# Patient Record
Sex: MALE | Race: WHITE | NOT HISPANIC OR LATINO | Employment: OTHER | RURAL
[De-identification: names, ages, dates, MRNs, and addresses within clinical notes are randomized per-mention and may not be internally consistent; named-entity substitution may affect disease eponyms.]

---

## 2023-03-06 ENCOUNTER — OFFICE VISIT (OUTPATIENT)
Dept: CARDIOLOGY | Facility: CLINIC | Age: 78
End: 2023-03-06
Payer: MEDICARE

## 2023-03-06 VITALS
SYSTOLIC BLOOD PRESSURE: 140 MMHG | WEIGHT: 208 LBS | BODY MASS INDEX: 25.86 KG/M2 | OXYGEN SATURATION: 97 % | DIASTOLIC BLOOD PRESSURE: 78 MMHG | HEIGHT: 75 IN | HEART RATE: 73 BPM

## 2023-03-06 DIAGNOSIS — E11.9 TYPE 2 DIABETES MELLITUS WITHOUT COMPLICATION, WITHOUT LONG-TERM CURRENT USE OF INSULIN: ICD-10-CM

## 2023-03-06 DIAGNOSIS — I65.23 BILATERAL CAROTID ARTERY OCCLUSION: ICD-10-CM

## 2023-03-06 DIAGNOSIS — I10 HTN (HYPERTENSION), BENIGN: ICD-10-CM

## 2023-03-06 DIAGNOSIS — I63.9 CEREBROVASCULAR ACCIDENT (CVA), UNSPECIFIED MECHANISM: Primary | ICD-10-CM

## 2023-03-06 DIAGNOSIS — I25.10 ATHEROSCLEROSIS OF NATIVE CORONARY ARTERY OF NATIVE HEART WITHOUT ANGINA PECTORIS: ICD-10-CM

## 2023-03-06 PROCEDURE — 99214 OFFICE O/P EST MOD 30 MIN: CPT | Performed by: NURSE PRACTITIONER

## 2023-03-06 RX ORDER — ATORVASTATIN CALCIUM 40 MG/1
40 TABLET, FILM COATED ORAL NIGHTLY
Qty: 90 TABLET | Refills: 1 | Status: SHIPPED | OUTPATIENT
Start: 2023-03-06

## 2023-03-06 RX ORDER — LISINOPRIL 10 MG/1
TABLET ORAL
COMMUNITY
Start: 2023-01-05 | End: 2023-03-06

## 2023-03-06 RX ORDER — METFORMIN HYDROCHLORIDE 500 MG/1
TABLET, EXTENDED RELEASE ORAL
COMMUNITY
Start: 2023-01-16

## 2023-03-06 RX ORDER — METOPROLOL SUCCINATE 25 MG/1
12.5 TABLET, EXTENDED RELEASE ORAL DAILY
COMMUNITY
Start: 2023-01-16 | End: 2023-03-06

## 2023-03-06 RX ORDER — ATORVASTATIN CALCIUM 40 MG/1
TABLET, FILM COATED ORAL
COMMUNITY
Start: 2023-01-16 | End: 2023-03-06

## 2023-03-06 RX ORDER — ASPIRIN 81 MG/1
81 TABLET ORAL DAILY
COMMUNITY

## 2023-03-06 RX ORDER — METOPROLOL SUCCINATE 25 MG/1
25 TABLET, EXTENDED RELEASE ORAL DAILY
Qty: 90 TABLET | Refills: 1 | Status: SHIPPED | OUTPATIENT
Start: 2023-03-06

## 2023-03-06 RX ORDER — LISINOPRIL 10 MG/1
10 TABLET ORAL DAILY
Qty: 90 TABLET | Refills: 1 | Status: SHIPPED | OUTPATIENT
Start: 2023-03-06

## 2023-03-06 NOTE — PROGRESS NOTES
[facility] Provider Note     Date:   2023   Name: Tong Viera  :   1945  PCP: Elaine Nelson APRN    Chief Complaint   Patient presents with   • Congestive Heart Failure   • Coronary Artery Disease     6 month follow up       Subjective     History of Present Illness  Tong Viera is a 77 y.o. male who presents today for coronary artery disease, hypertension, mild bilateral carotid artery disease.  Patient denies any chest pain, shortness of air, edema, palpitations, or syncope.  He has blood pressure is not at goal.  Patient is willing to take 1 whole metoprolol daily as opposed to half.        Allergies   Allergen Reactions   • Cephalexin Unknown - Low Severity   • Demerol [Meperidine] Unknown - Low Severity   • Percocet [Oxycodone-Acetaminophen] Unknown - Low Severity       Current Outpatient Medications:   •  aspirin 81 MG EC tablet, Take 1 tablet by mouth Daily. Every other day, Disp: , Rfl:   •  atorvastatin (LIPITOR) 40 MG tablet, Take 1 tablet by mouth Every Night., Disp: 90 tablet, Rfl: 1  •  lisinopril (PRINIVIL,ZESTRIL) 10 MG tablet, Take 1 tablet by mouth Daily., Disp: 90 tablet, Rfl: 1  •  metFORMIN ER (GLUCOPHAGE-XR) 500 MG 24 hr tablet, , Disp: , Rfl:   •  metoprolol succinate XL (TOPROL-XL) 25 MG 24 hr tablet, Take 1 tablet by mouth Daily., Disp: 90 tablet, Rfl: 1    Past Medical History:   Diagnosis Date   • Allergies     DEMEROL   • Athscl heart disease of native cor art w ot ang pctrs ([facility])    • CAD (coronary artery disease)    • Carotid stenosis    • CVA (cerebral vascular accident) ([facility])    • Essential (primary) hypertension    • Hypercholesteremia    • Hypertension    • Infectious gastroenteritis and colitis, unspecified    • Occlusion and stenosis of bilateral carotid arteries    • Skin cancer    • Type 2 diabetes mellitus ([facility])       Past Surgical History:   Procedure Laterality Date   • CAROTID STENT     • REPLACEMENT TOTAL KNEE Right   "    Family History   Problem Relation Age of Onset   • No Known Problems Sister    • Heart disease Brother 74        CABG     Social History     Socioeconomic History   • Marital status: Single   Tobacco Use   • Smoking status: Former     Types: Cigarettes   Substance and Sexual Activity   • Drug use: Not Currently       Objective     Vital Signs:  /78 (BP Location: Left arm)   Pulse 73   Ht 190.5 cm (75\")   Wt 94.3 kg (208 lb)   SpO2 97%   BMI 26.00 kg/m²   Estimated body mass index is 26 kg/m² as calculated from the following:    Height as of this encounter: 190.5 cm (75\").    Weight as of this encounter: 94.3 kg (208 lb).             Physical Exam  Vitals reviewed.   Constitutional:       Appearance: Normal appearance.   Eyes:      Pupils: Pupils are equal, round, and reactive to light.   Neck:      Vascular: No carotid bruit.   Cardiovascular:      Rate and Rhythm: Normal rate and regular rhythm.      Pulses: Normal pulses.      Heart sounds: Normal heart sounds.   Pulmonary:      Effort: Pulmonary effort is normal.      Breath sounds: Normal breath sounds.   Musculoskeletal:         General: Normal range of motion.      Right lower leg: No edema.      Left lower leg: No edema.   Skin:     General: Skin is warm and dry.   Neurological:      Mental Status: He is alert and oriented to person, place, and time.      Gait: Gait is intact.   Psychiatric:         Attention and Perception: Attention normal.                     Assessment and Plan     Diagnoses and all orders for this visit:    1. Cerebrovascular accident (CVA), unspecified mechanism (HCC) (Primary)  Assessment & Plan:  History 2020 with 2 stents.  On aspirin, statin, blood pressure lowering medication.    Orders:  -     atorvastatin (LIPITOR) 40 MG tablet; Take 1 tablet by mouth Every Night.  Dispense: 90 tablet; Refill: 1  -     lisinopril (PRINIVIL,ZESTRIL) 10 MG tablet; Take 1 tablet by mouth Daily.  Dispense: 90 tablet; Refill: 1  -     " metoprolol succinate XL (TOPROL-XL) 25 MG 24 hr tablet; Take 1 tablet by mouth Daily.  Dispense: 90 tablet; Refill: 1    2. Atherosclerosis of native coronary artery of native heart without angina pectoris  Assessment & Plan:  Stable.  Denies chest pain.  Able to do ADLs.  On medical therapy.    Orders:  -     atorvastatin (LIPITOR) 40 MG tablet; Take 1 tablet by mouth Every Night.  Dispense: 90 tablet; Refill: 1  -     lisinopril (PRINIVIL,ZESTRIL) 10 MG tablet; Take 1 tablet by mouth Daily.  Dispense: 90 tablet; Refill: 1  -     metoprolol succinate XL (TOPROL-XL) 25 MG 24 hr tablet; Take 1 tablet by mouth Daily.  Dispense: 90 tablet; Refill: 1    3. HTN (hypertension), benign  Assessment & Plan:  Blood pressure not at goal today given history of coronary artery disease and CVA.  Will change metoprolol 25 mg half daily to 1 whole 1 daily.  Patient wants to come back in 6 months and not for blood pressure check sooner.  Agrees to check his blood pressure once weekly for 3 weeks and to call me with readings.    Orders:  -     lisinopril (PRINIVIL,ZESTRIL) 10 MG tablet; Take 1 tablet by mouth Daily.  Dispense: 90 tablet; Refill: 1  -     metoprolol succinate XL (TOPROL-XL) 25 MG 24 hr tablet; Take 1 tablet by mouth Daily.  Dispense: 90 tablet; Refill: 1    4. Bilateral carotid artery occlusion  Assessment & Plan:  Mild September 2021.  On aspirin and statin.    Orders:  -     atorvastatin (LIPITOR) 40 MG tablet; Take 1 tablet by mouth Every Night.  Dispense: 90 tablet; Refill: 1    5. Type 2 diabetes mellitus without complication, without long-term current use of insulin (HCC)  Assessment & Plan:  Diagnosed 2021.  Will need lifelong statin    Orders:  -     atorvastatin (LIPITOR) 40 MG tablet; Take 1 tablet by mouth Every Night.  Dispense: 90 tablet; Refill: 1      Recommendations: ER if symptoms increase, Limitations of stress testing for definitive diagnosis reviewed, Sleep hygiene discussed, Limit salt, Stop  cigarettes, Limit caffeine and Report if any new/changing symptoms immediately          Follow Up  Return in about 6 months (around 9/6/2023) for Cad/htn.  Patient was given instructions and counseling regarding his condition or for health maintenance advice. Please see specific information pulled into the AVS if appropriate.

## 2023-03-06 NOTE — ASSESSMENT & PLAN NOTE
Blood pressure not at goal today given history of coronary artery disease and CVA.  Will change metoprolol 25 mg half daily to 1 whole 1 daily.  Patient wants to come back in 6 months and not for blood pressure check sooner.  Agrees to check his blood pressure once weekly for 3 weeks and to call me with readings.

## 2023-03-06 NOTE — PATIENT INSTRUCTIONS
Take a whole Metoprolol daily, go back to half if feel dizzy.    Take BP once weekly, then call office with readings.

## 2023-09-12 ENCOUNTER — OFFICE VISIT (OUTPATIENT)
Dept: CARDIOLOGY | Facility: CLINIC | Age: 78
End: 2023-09-12
Payer: MEDICARE

## 2023-09-12 VITALS
SYSTOLIC BLOOD PRESSURE: 130 MMHG | OXYGEN SATURATION: 97 % | HEIGHT: 75 IN | HEART RATE: 55 BPM | DIASTOLIC BLOOD PRESSURE: 78 MMHG | BODY MASS INDEX: 25.61 KG/M2 | WEIGHT: 206 LBS

## 2023-09-12 DIAGNOSIS — I10 HTN (HYPERTENSION), BENIGN: ICD-10-CM

## 2023-09-12 DIAGNOSIS — I25.10 ATHEROSCLEROSIS OF NATIVE CORONARY ARTERY OF NATIVE HEART WITHOUT ANGINA PECTORIS: ICD-10-CM

## 2023-09-12 PROCEDURE — 3075F SYST BP GE 130 - 139MM HG: CPT | Performed by: NURSE PRACTITIONER

## 2023-09-12 PROCEDURE — 99214 OFFICE O/P EST MOD 30 MIN: CPT | Performed by: NURSE PRACTITIONER

## 2023-09-12 PROCEDURE — 3078F DIAST BP <80 MM HG: CPT | Performed by: NURSE PRACTITIONER

## 2023-09-12 RX ORDER — ATORVASTATIN CALCIUM 40 MG/1
40 TABLET, FILM COATED ORAL NIGHTLY
Qty: 90 TABLET | Refills: 1 | Status: SHIPPED | OUTPATIENT
Start: 2023-09-12

## 2023-09-12 RX ORDER — LISINOPRIL 10 MG/1
10 TABLET ORAL DAILY
Qty: 90 TABLET | Refills: 1 | Status: SHIPPED | OUTPATIENT
Start: 2023-09-12

## 2023-09-12 RX ORDER — METOPROLOL SUCCINATE 25 MG/1
25 TABLET, EXTENDED RELEASE ORAL DAILY
Qty: 90 TABLET | Refills: 1 | Status: SHIPPED | OUTPATIENT
Start: 2023-09-12

## 2023-09-12 NOTE — PROGRESS NOTES
[facility] Provider Note     Date:   2023   Name: Tong Viera  :   1945  PCP: Elaine Nelson APRN    Chief Complaint   Patient presents with    Coronary Artery Disease    Hypertension       Subjective     History of Present Illness  Tong Viera is a 78 y.o. male who presents today for follow-up on CAD and hypertension.  He is due for an updated EKG today but the leads would have to go in the area that he has an incision.  He had a mole removed 2 weeks ago under his left breast and has several stitches and significant bruising with tenderness.  He goes back to dermatology next week to have stitches removed.  He said he will be seeing his PCP next week for labs and ask if he could get an EKG at that time.    He said Miss Kelly Viera that works next-door is his grandson's wife.    He denies any chest pain, shortness of air, edema, palpitations, or syncope.  Last visit his blood pressure was not at goal.  He was willing to try to take 1 whole metoprolol daily as opposed to half.  He reports he has been doing well with this and has not had any dizziness or any signs or symptoms of hypotension.  Blood pressure today 130/78.    Cardiology and sleep related problem list    1.  CAD-s/p stenting  2.  Hypertension  3.  Former smoker    2021 left heart cath-patent pre-existing LAD stent.  No significant coronary stenosis.  There is a small OM branch with a proximal stenosis 50 to 70%.    2021 Lexiscan-minimum to mild lateral ischemia, low to intermediate risk.    Have asked staff to obtain last echo from [facility]    CVA or MI with stents in ?????    Allergies   Allergen Reactions    Cephalexin Unknown - Low Severity    Demerol [Meperidine] Unknown - Low Severity    Percocet [Oxycodone-Acetaminophen] Unknown - Low Severity       Current Outpatient Medications:     aspirin 81 MG EC tablet, Take 1 tablet by mouth Daily. Every other day, Disp: , Rfl:     atorvastatin  "(LIPITOR) 40 MG tablet, Take 1 tablet by mouth Every Night., Disp: 90 tablet, Rfl: 1    lisinopril (PRINIVIL,ZESTRIL) 10 MG tablet, Take 1 tablet by mouth Daily., Disp: 90 tablet, Rfl: 1    metFORMIN ER (GLUCOPHAGE-XR) 500 MG 24 hr tablet, , Disp: , Rfl:     metoprolol succinate XL (TOPROL-XL) 25 MG 24 hr tablet, Take 1 tablet by mouth Daily., Disp: 90 tablet, Rfl: 1    mupirocin (BACTROBAN) 2 % ointment, APPLY TO SURGICAL SITE DAILY. KEEP COVERED WITH BANDAGE, Disp: , Rfl:     Past Medical History:   Diagnosis Date    Allergies     DEMEROL    Athscl heart disease of native cor art w oth ang pctrs     CAD (coronary artery disease)     Carotid stenosis     CVA (cerebral vascular accident)     Essential (primary) hypertension     Hypercholesteremia     Hypertension     Infectious gastroenteritis and colitis, unspecified     Occlusion and stenosis of bilateral carotid arteries     Skin cancer     Type 2 diabetes mellitus       Past Surgical History:   Procedure Laterality Date    CAROTID STENT  2003    MOLE REMOVAL      REPLACEMENT TOTAL KNEE Right      Family History   Problem Relation Age of Onset    No Known Problems Sister     Heart disease Brother 74        CABG     Social History     Socioeconomic History    Marital status: Single   Tobacco Use    Smoking status: Former     Types: Cigarettes     Passive exposure: Past    Smokeless tobacco: Never   Vaping Use    Vaping Use: Never used   Substance and Sexual Activity    Alcohol use: Never    Drug use: Not Currently    Sexual activity: Defer       Objective     Vital Signs:  /78   Pulse 55   Ht 190.5 cm (75\")   Wt 93.4 kg (206 lb)   SpO2 97%   BMI 25.75 kg/m²   Estimated body mass index is 25.75 kg/m² as calculated from the following:    Height as of this encounter: 190.5 cm (75\").    Weight as of this encounter: 93.4 kg (206 lb).             Physical Exam  Vitals reviewed.   Constitutional:       Appearance: Normal appearance. He is well-developed. "   HENT:      Head: Normocephalic and atraumatic.   Eyes:      General: No scleral icterus.     Pupils: Pupils are equal, round, and reactive to light.   Neck:      Vascular: No carotid bruit.   Cardiovascular:      Rate and Rhythm: Normal rate and regular rhythm.      Pulses: Normal pulses.           Radial pulses are 2+ on the right side and 2+ on the left side.        Dorsalis pedis pulses are 2+ on the right side and 2+ on the left side.        Posterior tibial pulses are 2+ on the right side and 2+ on the left side.      Heart sounds: Normal heart sounds. No murmur heard.  Pulmonary:      Breath sounds: Normal breath sounds. No wheezing or rhonchi.   Musculoskeletal:      Right lower leg: No edema.      Left lower leg: No edema.   Skin:     General: Skin is warm and dry.      Capillary Refill: Capillary refill takes less than 2 seconds.      Coloration: Skin is not cyanotic.      Findings: Lesion present.      Nails: There is no clubbing.      Comments: Stitches intact and dry. ATILIO. Under left breast. Bruising and tenderness present.   Neurological:      Mental Status: He is alert and oriented to person, place, and time.      Motor: No weakness.      Gait: Gait normal.   Psychiatric:         Mood and Affect: Mood normal.         Behavior: Behavior is cooperative.         Thought Content: Thought content normal.         Cognition and Memory: Memory normal.                   Assessment and Plan     Diagnoses and all orders for this visit:    1. Atherosclerosis of native coronary artery of native heart without angina pectoris  Comments:  S/p 2 cardiac stents. stable.  On medical therapy.  Plans to get updated EKG and labs with PCP next week.  Orders:  -     atorvastatin (LIPITOR) 40 MG tablet; Take 1 tablet by mouth Every Night.  Dispense: 90 tablet; Refill: 1  -     lisinopril (PRINIVIL,ZESTRIL) 10 MG tablet; Take 1 tablet by mouth Daily.  Dispense: 90 tablet; Refill: 1  -     metoprolol succinate XL (TOPROL-XL)  25 MG 24 hr tablet; Take 1 tablet by mouth Daily.  Dispense: 90 tablet; Refill: 1    2. HTN (hypertension), benign  Comments:  Doing well on 1 whole metoprolol daily instead of half.  Plan to continue.        Recommendations: ER if symptoms increase and Report if any new/changing symptoms immediately              Follow Up  Return in about 6 months (around 3/12/2024) for CAD/HTN.    Leonie Montes, APRN   09/12/2023     Please note that this explicitly excludes time spent on other separate billable services such as performing procedures or test interpretation, when applicable.    This note was created using dictation software which occasionally transcribes nonsensical phrases. Please contact the provider if any clarification is needed.

## 2024-03-26 ENCOUNTER — OFFICE VISIT (OUTPATIENT)
Dept: CARDIOLOGY | Facility: CLINIC | Age: 79
End: 2024-03-26
Payer: MEDICARE

## 2024-03-26 VITALS
SYSTOLIC BLOOD PRESSURE: 130 MMHG | DIASTOLIC BLOOD PRESSURE: 74 MMHG | BODY MASS INDEX: 25.86 KG/M2 | OXYGEN SATURATION: 97 % | WEIGHT: 208 LBS | HEIGHT: 75 IN | HEART RATE: 54 BPM

## 2024-03-26 DIAGNOSIS — I25.10 ATHEROSCLEROSIS OF NATIVE CORONARY ARTERY OF NATIVE HEART WITHOUT ANGINA PECTORIS: Primary | ICD-10-CM

## 2024-03-26 DIAGNOSIS — I10 HTN (HYPERTENSION), BENIGN: ICD-10-CM

## 2024-03-26 PROCEDURE — 93000 ELECTROCARDIOGRAM COMPLETE: CPT | Performed by: NURSE PRACTITIONER

## 2024-03-26 PROCEDURE — 3078F DIAST BP <80 MM HG: CPT | Performed by: NURSE PRACTITIONER

## 2024-03-26 PROCEDURE — 3075F SYST BP GE 130 - 139MM HG: CPT | Performed by: NURSE PRACTITIONER

## 2024-03-26 PROCEDURE — 1159F MED LIST DOCD IN RCRD: CPT | Performed by: NURSE PRACTITIONER

## 2024-03-26 PROCEDURE — 99214 OFFICE O/P EST MOD 30 MIN: CPT | Performed by: NURSE PRACTITIONER

## 2024-03-26 PROCEDURE — 1160F RVW MEDS BY RX/DR IN RCRD: CPT | Performed by: NURSE PRACTITIONER

## 2024-03-26 RX ORDER — LISINOPRIL 10 MG/1
10 TABLET ORAL DAILY
Qty: 90 TABLET | Refills: 3 | Status: SHIPPED | OUTPATIENT
Start: 2024-03-26

## 2024-03-26 RX ORDER — ATORVASTATIN CALCIUM 40 MG/1
40 TABLET, FILM COATED ORAL NIGHTLY
Qty: 90 TABLET | Refills: 3 | Status: SHIPPED | OUTPATIENT
Start: 2024-03-26

## 2024-03-26 RX ORDER — METOPROLOL SUCCINATE 25 MG/1
25 TABLET, EXTENDED RELEASE ORAL DAILY
Qty: 90 TABLET | Refills: 3 | Status: SHIPPED | OUTPATIENT
Start: 2024-03-26

## 2024-03-26 NOTE — PROGRESS NOTES
[facility] Provider Note     Date:   2024   Name: Tong Viera  :   1945  PCP: Elaine Nelson APRN    Chief Complaint   Patient presents with    Coronary Artery Disease       Subjective     History of Present Illness  Tong Viera is a 78 y.o. male who presents today for 6-month follow-up visit for CAD and hypertension.  Patient has a history of CAD with previous stent to LAD in .  He reports that he has been doing very well since his last visit.  He denies chest pain, shortness of breath, palpitations, lower extremity edema or syncope.  He has no specific concerns or complaints today.  EKG showed sinus bradycardia with a rate of 53 bpm, nonspecific T wave abnormality.  He has not had routine labs in over a year.  His blood pressure has been stable at home.  It is 130/74 today.  Overall, patient is doing very well today.    Cardiac History     1.  CAD-s/p LAD stent in   2.  Hypertension  3.  Former smoker  4.  CVA in   5.  Mild carotid stenosis     2021 [facility]-patent pre-existing LAD stent.  No significant coronary stenosis.  There is a small OM branch with a proximal stenosis 50 to 70%.    2021 Lexiscan-minimum to mild lateral ischemia, low to intermediate risk.    Carotid Duplex - 16-49% stenosis bilaterally.     Reports Denies   Chest Pain [] [x]   Shortness of Air [] [x]   Palpitations [] [x]   Edema [] [x]   Dizziness [] [x]   Syncope [] [x]       Allergies   Allergen Reactions    Cephalexin Unknown - Low Severity    Demerol [Meperidine] Unknown - Low Severity    Percocet [Oxycodone-Acetaminophen] Unknown - Low Severity       Current Outpatient Medications:     aspirin 81 MG EC tablet, Take 1 tablet by mouth Daily. Every other day, Disp: , Rfl:     atorvastatin (LIPITOR) 40 MG tablet, Take 1 tablet by mouth Every Night., Disp: 90 tablet, Rfl: 3    lisinopril (PRINIVIL,ZESTRIL) 10 MG tablet, Take 1 tablet by mouth Daily., Disp: 90 tablet,  "Rfl: 3    metFORMIN ER (GLUCOPHAGE-XR) 500 MG 24 hr tablet, , Disp: , Rfl:     metoprolol succinate XL (TOPROL-XL) 25 MG 24 hr tablet, Take 1 tablet by mouth Daily., Disp: 90 tablet, Rfl: 3    Past Medical History:   Diagnosis Date    Allergies     DEMEROL    Athscl heart disease of native cor art w oth ang pctrs     CAD (coronary artery disease)     Carotid stenosis     CVA (cerebral vascular accident)     Essential (primary) hypertension     Hypercholesteremia     Hypertension     Infectious gastroenteritis and colitis, unspecified     Occlusion and stenosis of bilateral carotid arteries     Skin cancer     Type 2 diabetes mellitus       Past Surgical History:   Procedure Laterality Date    CAROTID STENT  2003    MOLE REMOVAL      REPLACEMENT TOTAL KNEE Right      Family History   Problem Relation Age of Onset    No Known Problems Sister     Heart disease Brother 74        CABG     Social History     Socioeconomic History    Marital status:    Tobacco Use    Smoking status: Former     Types: Cigarettes     Passive exposure: Past    Smokeless tobacco: Never   Vaping Use    Vaping status: Never Used   Substance and Sexual Activity    Alcohol use: Never    Drug use: Not Currently    Sexual activity: Defer       Objective     Vital Signs:  /74   Pulse 54   Ht 190.5 cm (75\")   Wt 94.3 kg (208 lb)   SpO2 97%   BMI 26.00 kg/m²   Estimated body mass index is 26 kg/m² as calculated from the following:    Height as of this encounter: 190.5 cm (75\").    Weight as of this encounter: 94.3 kg (208 lb).       BMI is >= 25 and <30. (Overweight) The following options were offered after discussion;: exercise counseling/recommendations      Physical Exam  Vitals reviewed.   Constitutional:       Appearance: Normal appearance.   HENT:      Head: Normocephalic.   Cardiovascular:      Rate and Rhythm: Normal rate and regular rhythm.      Heart sounds: Normal heart sounds.   Pulmonary:      Effort: Pulmonary effort is " normal.      Breath sounds: Normal breath sounds.   Musculoskeletal:      Right lower leg: No edema.      Left lower leg: No edema.   Skin:     General: Skin is warm and dry.      Capillary Refill: Capillary refill takes less than 2 seconds.   Neurological:      General: No focal deficit present.      Mental Status: He is alert and oriented to person, place, and time.   Psychiatric:         Mood and Affect: Mood normal.         Behavior: Behavior normal.                 ECG 12 Lead    Date/Time: 3/26/2024 3:23 PM  Performed by: Poonam Mills APRN    Authorized by: Poonam Mills APRN  Comparison: not compared with previous ECG   Previous ECG: no previous ECG available  Rhythm: sinus rhythm  Rate: bradycardic  BPM: 53  QRS axis: normal  Other findings: T wave abnormality    Clinical impression: abnormal EKG           Assessment and Plan     Diagnoses and all orders for this visit:    1. Atherosclerosis of native coronary artery of native heart without angina pectoris (Primary)  Assessment & Plan:  Coronary artery disease is stable.  Continue current treatment regimen. Regular aerobic exercise.  Cardiac status will be reassessed in 6 months.    Patient is currently asymptomatic.  - Continue aspirin, atorvastatin and metoprolol at current doses  - Update labs, including lipid panel.    Orders:  -     metoprolol succinate XL (TOPROL-XL) 25 MG 24 hr tablet; Take 1 tablet by mouth Daily.  Dispense: 90 tablet; Refill: 3  -     atorvastatin (LIPITOR) 40 MG tablet; Take 1 tablet by mouth Every Night.  Dispense: 90 tablet; Refill: 3  -     CBC & Differential; Future  -     Comprehensive Metabolic Panel; Future  -     Lipid Panel; Future    2. HTN (hypertension), benign  Assessment & Plan:  Hypertension is stable and controlled  Continue current treatment regimen.  Regular aerobic exercise.  Blood pressure will be reassessed in 6 months.    Orders:  -     metoprolol succinate XL (TOPROL-XL) 25 MG 24 hr tablet;  Take 1 tablet by mouth Daily.  Dispense: 90 tablet; Refill: 3  -     lisinopril (PRINIVIL,ZESTRIL) 10 MG tablet; Take 1 tablet by mouth Daily.  Dispense: 90 tablet; Refill: 3  -     CBC & Differential; Future  -     Comprehensive Metabolic Panel; Future    Other orders  -     ECG 12 Lead        Recommendations: Report if any new/changing symptoms immediately          Follow Up  Return in about 6 months (around 9/26/2024) for CAD, HTN. with [place] .  Patient was given instructions and counseling regarding his condition or for health maintenance advice. Please see specific information pulled into the AVS if appropriate.

## 2024-03-26 NOTE — ASSESSMENT & PLAN NOTE
Coronary artery disease is stable.  Continue current treatment regimen. Regular aerobic exercise.  Cardiac status will be reassessed in 6 months.    Patient is currently asymptomatic.  - Continue aspirin, atorvastatin and metoprolol at current doses  - Update labs, including lipid panel.

## 2024-03-26 NOTE — ASSESSMENT & PLAN NOTE
Hypertension is stable and controlled  Continue current treatment regimen.  Regular aerobic exercise.  Blood pressure will be reassessed in 6 months.

## 2024-04-01 ENCOUNTER — TELEPHONE (OUTPATIENT)
Dept: CARDIOLOGY | Facility: CLINIC | Age: 79
End: 2024-04-01
Payer: MEDICARE

## 2024-04-01 DIAGNOSIS — I10 HTN (HYPERTENSION), BENIGN: ICD-10-CM

## 2024-04-01 DIAGNOSIS — I25.10 ATHEROSCLEROSIS OF NATIVE CORONARY ARTERY OF NATIVE HEART WITHOUT ANGINA PECTORIS: ICD-10-CM

## 2024-04-01 NOTE — TELEPHONE ENCOUNTER
Labs reviewed: CMP, CBC and Lipids. Cholesterol looks good. CBC- platelets are slightly low, but no concerns would just recheck with next routine labs and compare results. CMP- kidney function compared to past last and this looks about the same and stable.

## 2024-09-27 ENCOUNTER — OFFICE VISIT (OUTPATIENT)
Dept: CARDIOLOGY | Facility: CLINIC | Age: 79
End: 2024-09-27
Payer: MEDICARE

## 2024-09-27 VITALS
OXYGEN SATURATION: 98 % | SYSTOLIC BLOOD PRESSURE: 112 MMHG | HEIGHT: 75 IN | WEIGHT: 201 LBS | DIASTOLIC BLOOD PRESSURE: 68 MMHG | HEART RATE: 65 BPM | BODY MASS INDEX: 24.99 KG/M2

## 2024-09-27 DIAGNOSIS — I10 HTN (HYPERTENSION), BENIGN: Chronic | ICD-10-CM

## 2024-09-27 DIAGNOSIS — I65.23 BILATERAL CAROTID ARTERY OCCLUSION: ICD-10-CM

## 2024-09-27 DIAGNOSIS — I25.10 ATHEROSCLEROSIS OF NATIVE CORONARY ARTERY OF NATIVE HEART WITHOUT ANGINA PECTORIS: Primary | Chronic | ICD-10-CM

## 2024-09-27 DIAGNOSIS — I63.9 CEREBROVASCULAR ACCIDENT (CVA), UNSPECIFIED MECHANISM: ICD-10-CM

## 2024-10-01 ENCOUNTER — PATIENT ROUNDING (BHMG ONLY) (OUTPATIENT)
Dept: CARDIOLOGY | Facility: CLINIC | Age: 79
End: 2024-10-01
Payer: MEDICARE

## 2024-10-01 NOTE — PROGRESS NOTES
..My name is Latasha Jaimes and I am the Practice Manager for [facility].    I would like to thank you for being a loyal patient. If you do not mind I would like to ask you a few questions about your recent visit with us.  Please feel free to reply if you wish to provide us with feedback on your first visit with our practice.    First, could you tell me what went well with your recent visit?    Secondly, we are always looking for ways to make our patients' experiences even better.  Do you have any recommendations on ways we may improve?    Finally, overall were you satisfied with your first visit to us as a [facility] facility?    In the next few days, you will be receiving a Patient Experience Survey.      Thank you for taking the time to answer a few questions today.  I hope you have a good day.

## 2024-11-14 DIAGNOSIS — J01.00 ACUTE NON-RECURRENT MAXILLARY SINUSITIS: Primary | ICD-10-CM

## 2024-11-14 RX ORDER — AZITHROMYCIN 250 MG/1
TABLET, FILM COATED ORAL
Qty: 6 TABLET | Refills: 0 | Status: SHIPPED | OUTPATIENT
Start: 2024-11-14

## 2024-11-14 RX ORDER — PREDNISONE 20 MG/1
20 TABLET ORAL DAILY
Qty: 3 TABLET | Refills: 0 | Status: SHIPPED | OUTPATIENT
Start: 2024-11-14 | End: 2024-11-17

## 2025-03-28 ENCOUNTER — OFFICE VISIT (OUTPATIENT)
Dept: CARDIOLOGY | Facility: CLINIC | Age: 80
End: 2025-03-28
Payer: MEDICARE

## 2025-03-28 VITALS
HEIGHT: 75 IN | BODY MASS INDEX: 25.71 KG/M2 | WEIGHT: 206.8 LBS | OXYGEN SATURATION: 97 % | DIASTOLIC BLOOD PRESSURE: 72 MMHG | SYSTOLIC BLOOD PRESSURE: 132 MMHG | HEART RATE: 74 BPM

## 2025-03-28 DIAGNOSIS — I63.9 CEREBROVASCULAR ACCIDENT (CVA), UNSPECIFIED MECHANISM: ICD-10-CM

## 2025-03-28 DIAGNOSIS — I25.10 ATHEROSCLEROSIS OF NATIVE CORONARY ARTERY OF NATIVE HEART WITHOUT ANGINA PECTORIS: Primary | ICD-10-CM

## 2025-03-28 DIAGNOSIS — I65.23 BILATERAL CAROTID ARTERY STENOSIS: ICD-10-CM

## 2025-03-28 DIAGNOSIS — R06.09 DOE (DYSPNEA ON EXERTION): ICD-10-CM

## 2025-03-28 DIAGNOSIS — I10 HTN (HYPERTENSION), BENIGN: ICD-10-CM

## 2025-03-28 RX ORDER — LISINOPRIL 10 MG/1
10 TABLET ORAL DAILY
Qty: 90 TABLET | Refills: 3 | Status: SHIPPED | OUTPATIENT
Start: 2025-03-28

## 2025-03-28 RX ORDER — METOPROLOL SUCCINATE 25 MG/1
25 TABLET, EXTENDED RELEASE ORAL DAILY
Qty: 90 TABLET | Refills: 3 | Status: SHIPPED | OUTPATIENT
Start: 2025-03-28

## 2025-03-28 RX ORDER — ATORVASTATIN CALCIUM 40 MG/1
40 TABLET, FILM COATED ORAL NIGHTLY
Qty: 90 TABLET | Refills: 3 | Status: SHIPPED | OUTPATIENT
Start: 2025-03-28

## 2025-03-28 NOTE — PROGRESS NOTES
[facility] Provider Note     Date:   2025  Name: Tong Viera  :   1945  PCP: Elaine Nelson APRN    Chief Complaint   Patient presents with    Coronary Artery Disease       Subjective     History of Present Illness  Tong Viera is a 79 y.o. male who presents today for follow up CAD.    Patient states that he thinks that he will go through another knee replacement surgery for his left knee.  Patient wants to know how he would know if his stent that was put in 20 years ago is blocked.  We discussed symptoms and reviewed his left heart cath from .  He reports that he does not have any symptoms.  He states that he has a left-sided stabbing chest pain that rarely ever happens.  He states it only last for a few seconds and then its gone.  He states that he just feels like that is where he is overdoing it for the day.  He states he has some shortness of breath with exertion that has pretty much always been there.  He denies any lower extremity edema, palpitations, presyncope, or syncope.    Cardiac History     1.  CAD-s/p LAD stent in   2.  Hypertension  3.  Former smoker  4.  CVA in   5.  Mild carotid stenosis     2021 [facility]-patent pre-existing LAD stent.  No significant coronary stenosis.  There is a small OM branch with a proximal stenosis 50 to 70%.    2021 Lexiscan-minimum to mild lateral ischemia, low to intermediate risk.    2021 echo overall left ventricular systolic function is normal with an EF 55-60%.  Left ventricle size is normal.  Mild concentric left ventricular hypertrophy.  Aortic valve is trileaflet, and appears structurally normal.  No aortic stenosis or regurgitation.  Trace to mild mitral regurgitation.  Diastolic function is abnormal.  Right ventricular systolic pressure is normal.  The aortic root and ascending aorta are mildly dilated measuring up to 3.7 cm.    Carotid Duplex 2021- 16-49% stenosis bilaterally.     Reports  Denies   Chest Pain [] [x]   Shortness of Air [x] []   Palpitations [] [x]   Edema [] [x]   Dizziness [] [x]   Syncope [] [x]       Allergies   Allergen Reactions    Cephalexin Unknown - Low Severity    Demerol [Meperidine] Unknown - Low Severity    Percocet [Oxycodone-Acetaminophen] Unknown - Low Severity       Current Outpatient Medications:     aspirin 81 MG EC tablet, Take 1 tablet by mouth Daily. Every other day, Disp: , Rfl:     atorvastatin (LIPITOR) 40 MG tablet, Take 1 tablet by mouth Every Night., Disp: 90 tablet, Rfl: 3    lisinopril (PRINIVIL,ZESTRIL) 10 MG tablet, Take 1 tablet by mouth Daily., Disp: 90 tablet, Rfl: 3    metFORMIN ER (GLUCOPHAGE-XR) 500 MG 24 hr tablet, , Disp: , Rfl:     metoprolol succinate XL (TOPROL-XL) 25 MG 24 hr tablet, Take 1 tablet by mouth Daily., Disp: 90 tablet, Rfl: 3    Past Medical History:   Diagnosis Date    Allergies     DEMEROL    Athscl heart disease of native cor art w oth ang pctrs     CAD (coronary artery disease)     Carotid stenosis     CVA (cerebral vascular accident)     Essential (primary) hypertension     Hypercholesteremia     Hypertension     Infectious gastroenteritis and colitis, unspecified     Occlusion and stenosis of bilateral carotid arteries     Skin cancer     Type 2 diabetes mellitus       Past Surgical History:   Procedure Laterality Date    CAROTID STENT  2003    MOLE REMOVAL      REPLACEMENT TOTAL KNEE Right      Family History   Problem Relation Age of Onset    No Known Problems Sister     Heart disease Brother 74        CABG     Social History     Socioeconomic History    Marital status:    Tobacco Use    Smoking status: Former     Types: Cigarettes     Passive exposure: Past    Smokeless tobacco: Never   Vaping Use    Vaping status: Never Used   Substance and Sexual Activity    Alcohol use: Never    Drug use: Not Currently    Sexual activity: Defer       Objective     Vital Signs:  /72 (BP Location: Left arm, Patient Position:  "Sitting, Cuff Size: Large Adult)   Pulse 74   Ht 190.5 cm (75\")   Wt 93.8 kg (206 lb 12.8 oz)   SpO2 97%   BMI 25.85 kg/m²   Estimated body mass index is 25.85 kg/m² as calculated from the following:    Height as of this encounter: 190.5 cm (75\").    Weight as of this encounter: 93.8 kg (206 lb 12.8 oz).             Physical Exam  Constitutional:       Appearance: Normal appearance.   Cardiovascular:      Rate and Rhythm: Normal rate and regular rhythm.      Pulses: Normal pulses.      Heart sounds: Normal heart sounds.   Pulmonary:      Effort: Pulmonary effort is normal.      Breath sounds: Normal breath sounds.   Musculoskeletal:         General: Normal range of motion.   Skin:     General: Skin is warm and dry.      Capillary Refill: Capillary refill takes less than 2 seconds.   Neurological:      General: No focal deficit present.      Mental Status: He is alert and oriented to person, place, and time.   Psychiatric:         Mood and Affect: Mood normal.         Behavior: Behavior normal.         Thought Content: Thought content normal.         Judgment: Judgment normal.                   ECG 12 Lead    Date/Time: 3/28/2025 12:42 PM  Performed by: Zita Deleon APRN    Authorized by: Zita Deleon APRN  Comparison: compared with previous ECG from 3/26/2024  Comparison to previous ECG: Sinus bradycardia  Nonspecific T wave abnormality  Rhythm: sinus rhythm  Rate: normal  BPM: 66  Conduction: conduction normal  QRS axis: normal    Clinical impression: non-specific ECG  Comments: Nonspecific ST and T wave abnormality           Assessment and Plan     Diagnoses and all orders for this visit:    1. Atherosclerosis of native coronary artery of native heart without angina pectoris (Primary)  Assessment & Plan:  Coronary artery disease is stable.  Continue atorvastatin, aspirin, lisinopril, and metoprolol.    Patient reports very rare stabbing left-sided chest pain that only lasts for a second then its gone. "  He reports that this might have happened approximately 3 times in 1 year.    Left heart cath 8/23/2021 showed patent pre-existing LAD stent.  No significant coronary stenosis.  There is a small OM branch with a proximal stenosis 50-70%.    Patient may be planning left knee replacement surgery but has not decided to do this yet.    Orders:  -     ECG 12 Lead  -     atorvastatin (LIPITOR) 40 MG tablet; Take 1 tablet by mouth Every Night.  Dispense: 90 tablet; Refill: 3  -     metoprolol succinate XL (TOPROL-XL) 25 MG 24 hr tablet; Take 1 tablet by mouth Daily.  Dispense: 90 tablet; Refill: 3  -     Adult Transthoracic Echo Complete W/ Cont if Necessary Per Protocol; Future    2. HTN (hypertension), benign  Assessment & Plan:  Hypertension is stable and controlled  Continue current treatment regimen.  Blood pressure will be reassessed in 6 months.    Can you lisinopril and metoprolol.    Orders:  -     lisinopril (PRINIVIL,ZESTRIL) 10 MG tablet; Take 1 tablet by mouth Daily.  Dispense: 90 tablet; Refill: 3  -     metoprolol succinate XL (TOPROL-XL) 25 MG 24 hr tablet; Take 1 tablet by mouth Daily.  Dispense: 90 tablet; Refill: 3  -     Adult Transthoracic Echo Complete W/ Cont if Necessary Per Protocol; Future    3. Cerebrovascular accident (CVA), unspecified mechanism    4. Bilateral carotid artery stenosis  Assessment & Plan:  Mild lateral carotid stenosis 16 to 49% on carotid duplex 9/16/2021.      5. POLANCO (dyspnea on exertion)  Assessment & Plan:  Patient reports stable shortness of breath with exertion.    Patient's last echo in 21 showed abnormal diastolic function and trace to mild mitral valve regurgitation.    Plan to recheck echo at 6 month follow-up.    Orders:  -     Adult Transthoracic Echo Complete W/ Cont if Necessary Per Protocol; Future        Recommendations: ER if symptoms increase, Report if any new/changing symptoms immediately, and Limit salt          Follow Up  Return in about 6 months (around  9/28/2025) for CAD with ECHO.  Patient was given instructions and counseling regarding his condition or for health maintenance advice. Please see specific information pulled into the AVS if appropriate.

## 2025-03-28 NOTE — ASSESSMENT & PLAN NOTE
Hypertension is stable and controlled  Continue current treatment regimen.  Blood pressure will be reassessed in 6 months.    Can you lisinopril and metoprolol.

## 2025-03-28 NOTE — ASSESSMENT & PLAN NOTE
Patient reports stable shortness of breath with exertion.    Patient's last echo in 21 showed abnormal diastolic function and trace to mild mitral valve regurgitation.    Plan to recheck echo at 6 month follow-up.

## 2025-03-28 NOTE — ASSESSMENT & PLAN NOTE
Coronary artery disease is stable.  Continue atorvastatin, aspirin, lisinopril, and metoprolol.    Patient reports very rare stabbing left-sided chest pain that only lasts for a second then its gone.  He reports that this might have happened approximately 3 times in 1 year.    Left heart cath 8/23/2021 showed patent pre-existing LAD stent.  No significant coronary stenosis.  There is a small OM branch with a proximal stenosis 50-70%.    Patient may be planning left knee replacement surgery but has not decided to do this yet.